# Patient Record
Sex: FEMALE
[De-identification: names, ages, dates, MRNs, and addresses within clinical notes are randomized per-mention and may not be internally consistent; named-entity substitution may affect disease eponyms.]

---

## 2024-06-18 ENCOUNTER — NON-APPOINTMENT (OUTPATIENT)
Age: 29
End: 2024-06-18

## 2024-06-26 ENCOUNTER — APPOINTMENT (OUTPATIENT)
Dept: ORTHOPEDIC SURGERY | Facility: CLINIC | Age: 29
End: 2024-06-26
Payer: COMMERCIAL

## 2024-06-26 VITALS — WEIGHT: 110 LBS | HEIGHT: 59 IN | BODY MASS INDEX: 22.18 KG/M2

## 2024-06-26 DIAGNOSIS — S61.214A LACERATION W/OUT FOREIGN BODY OF RIGHT RING FINGER W/OUT DAMAGE TO NAIL, INITIAL ENCOUNTER: ICD-10-CM

## 2024-06-26 PROBLEM — Z00.00 ENCOUNTER FOR PREVENTIVE HEALTH EXAMINATION: Status: ACTIVE | Noted: 2024-06-26

## 2024-06-26 PROCEDURE — 99203 OFFICE O/P NEW LOW 30 MIN: CPT

## 2024-06-27 PROBLEM — S61.214A LACERATION OF RIGHT RING FINGER WITHOUT FOREIGN BODY WITHOUT DAMAGE TO NAIL, INITIAL ENCOUNTER: Status: ACTIVE | Noted: 2024-06-27

## 2024-07-10 ENCOUNTER — APPOINTMENT (OUTPATIENT)
Dept: ORTHOPEDIC SURGERY | Facility: CLINIC | Age: 29
End: 2024-07-10
Payer: COMMERCIAL

## 2024-07-10 DIAGNOSIS — S61.214A LACERATION W/OUT FOREIGN BODY OF RIGHT RING FINGER W/OUT DAMAGE TO NAIL, INITIAL ENCOUNTER: ICD-10-CM

## 2024-07-10 PROCEDURE — 99213 OFFICE O/P EST LOW 20 MIN: CPT

## 2024-07-29 ENCOUNTER — APPOINTMENT (OUTPATIENT)
Dept: ORTHOPEDIC SURGERY | Facility: CLINIC | Age: 29
End: 2024-07-29
Payer: COMMERCIAL

## 2024-07-29 DIAGNOSIS — S61.214A LACERATION W/OUT FOREIGN BODY OF RIGHT RING FINGER W/OUT DAMAGE TO NAIL, INITIAL ENCOUNTER: ICD-10-CM

## 2024-07-29 PROCEDURE — 99213 OFFICE O/P EST LOW 20 MIN: CPT

## 2024-07-29 NOTE — DISCUSSION/SUMMARY
[de-identified] : Patient is about 6 weeks from the injury.  She will continue with the Vaseline dressings for about another week or 2. She may shower normally and then redress it. She understands she may lose her nail and if it happens it can take 6 months to grow back. I will see her back in about a month for repeat evaluation. All questions were answered.

## 2024-07-29 NOTE — PHYSICAL EXAM
[de-identified] : Physical exam of her right ring finger: The skin is healing well.  The wound is clean.  Maceration is improving. No signs of drainage, pus, or infection.

## 2024-07-29 NOTE — REASON FOR VISIT
Where Is Your Acne Located?: Back Additional Comments (Use Complete Sentences): Pt states has been using Benadryl and cortisone [FreeTextEntry2] : Right ring finger.

## 2024-08-28 ENCOUNTER — APPOINTMENT (OUTPATIENT)
Dept: ORTHOPEDIC SURGERY | Facility: CLINIC | Age: 29
End: 2024-08-28

## 2024-08-31 NOTE — HISTORY OF PRESENT ILLNESS
[de-identified] : Patient is a 29-year-old female here for repeat evaluation of her right ring finger.  She is accompanied by her .  Patient is  almost 6 weeks status post laceration to her right ring finger.  She is doing well.  She been doing Vaseline dressings.
None